# Patient Record
(demographics unavailable — no encounter records)

---

## 2024-11-26 NOTE — HISTORY OF PRESENT ILLNESS
[FreeTextEntry1] : CPE [de-identified] : 60 y/o M with PMH of obesity, HTN, HLD presents for CPE. He ran out of HTN medications 1.5 months ago. No other acute medical complaints

## 2024-11-26 NOTE — PLAN
[FreeTextEntry1] : #alcohol abuse #hx elevated LFTs advised patient on the importance of quitting alcohol patient declined program or therapy educated patient how alcohol abuse can affect his HTN, diabetes, obesity, and overall mortality sent for abdominal ultrasound   #HTN BP in office today 160/100 Encouraged regular exercise, weight loss, adhering to a DASH diet, minimizing alcohol intake, getting adequate sleep, and monitoring BP at home regularly Advised to take blood pressure at home if patient experiences dizziness, lightheadedness, or get a sudden headache  sent lisinopril 20mg QD refill  #HLD Advised patient on regular exercise, weight loss, and maintaining a low fat diet refilled simvastatin   #DM Patient reports home FS averages of Last A1C 7.6 Sent for A1C in office today Encouraged regularly monitoring home blood sugars, regular exercise, weight loss, adhering to a low carb/low fat diet Advised to monitor for symptoms of high blood sugars including excessive thirst, frequent urination, dry mouth, poor concentration and fatigue refilled glipizide and metformin  #Health Maintenance underwent fecal occult previously- was normal offered pt colonoscopy, he declined- sent for fecal occult Routine EKG performed today Routine labs sent including CBC, CMP, A1C, Lipid Profile, TSH w/ reflex, HIV, Hep C, UA, PSA follow up in 5 weeks

## 2024-11-26 NOTE — HEALTH RISK ASSESSMENT
[Excellent] : ~his/her~  mood as  excellent [Yes] : Yes [4 or more  times a week (4 pts)] : 4 or more  times a week (4 points) [3 or 4 (1 pt)] : 3 or 4  (1 point) [Less than monthly (1 pt)] : Less than monthly (1 point) [No falls in past year] : Patient reported no falls in the past year [Former] : Former [15-19] : 15-19 [> 15 Years] : > 15 Years [NO] : No [Patient declined colonoscopy] : Patient declined colonoscopy [HIV Test offered] : HIV Test offered [Hepatitis C test offered] : Hepatitis C test offered [None] : None [With Family] : lives with family [] :  [Sexually Active] : sexually active [Feels Safe at Home] : Feels safe at home [Fully functional (bathing, dressing, toileting, transferring, walking, feeding)] : Fully functional (bathing, dressing, toileting, transferring, walking, feeding) [Fully functional (using the telephone, shopping, preparing meals, housekeeping, doing laundry, using] : Fully functional and needs no help or supervision to perform IADLs (using the telephone, shopping, preparing meals, housekeeping, doing laundry, using transportation, managing medications and managing finances) [Smoke Detector] : smoke detector [Carbon Monoxide Detector] : carbon monoxide detector [Safety elements used in home] : safety elements used in home [Seat Belt] :  uses seat belt [Audit-CScore] : 6 [Change in mental status noted] : No change in mental status noted [Reports changes in hearing] : Reports no changes in hearing [Reports changes in vision] : Reports no changes in vision [Reports changes in dental health] : Reports no changes in dental health [Travel to Developing Areas] : does not  travel to developing areas [TB Exposure] : is not being exposed to tuberculosis [Caregiver Concerns] : does not have caregiver concerns [ColonoscopyComments] : previous negative fecal occult

## 2024-11-26 NOTE — COUNSELING
[Engage in a relaxing activity] : Engage in a relaxing activity [No] : Risk of tobacco use and health benefits of smoking cessation discussed: No [AUDIT-C Screening administered and reviewed] : AUDIT-C Screening administered and reviewed [Hazards of at-risk alcohol use discussed] : Hazards of at-risk alcohol use discussed [Strategies to reduce or eliminate alcohol use discussed] : Strategies to reduce or eliminate alcohol use discussed [Needs reinforcement, provided] : Patient needs reinforcement on understanding of disease, goals and obesity follow-up plan; reinforcement was provided [None] : None [Good understanding] : Patient has a good understanding of lifestyle changes and steps needed to achieve self management goal

## 2025-02-03 NOTE — ASSESSMENT
[FreeTextEntry1] :  #HTN BP in office today 120/80 since restarting BP medication Previously 160/100 when non compliant with medication continue lisinopril 20mg daily Encouraged regular exercise, weight loss, adhering to a DASH diet, minimizing alcohol intake, getting adequate sleep, and monitoring BP at home regularly Advised to take blood pressure at home if patient experiences dizziness, lightheadedness, or get a sudden headache    #DM Last A1C 7.6 A1C in office today 9.2 Patient notes he had a few day episode of increased thirst with increased urination, no current symptoms in office today. I noted to him that this may be indicative of hyperglycemia. Pt notes he was only taking metformin 1000mg in the morning Reenforced to take metformin 1000mg BID Daughter at bedside also endorses that diet needs improvement Encouraged regularly monitoring home blood sugars, regular exercise, weight loss, adhering to a low carb/low fat diet, advised to increase vegetable intake and reduce carbohydrates Advised to monitor for symptoms of high blood sugars including excessive thirst, frequent urination, dry mouth, poor concentration and fatigue  #HCM f/u in 10 weeks

## 2025-02-03 NOTE — HISTORY OF PRESENT ILLNESS
[FreeTextEntry1] : f/u visit [de-identified] : 61 y/o M with PMH of HTN, HLD, DM2 and obesity presents for follow up. Last time he was here his BP was elevated at 160/100 due to medication noncompliance. Since then he states he is taking his HTN medications regularly and /80 today. He notes that two weeks ago he had several days of excessive thirst with increased urination, symptoms have since resolved. Upon further discussion it was discovered that he was only taking metformin 1000mg in the morning.

## 2025-02-03 NOTE — COUNSELING
[Potential consequences of obesity discussed] : Potential consequences of obesity discussed [Benefits of weight loss discussed] : Benefits of weight loss discussed [Encouraged to maintain food diary] : Encouraged to maintain food diary [Encouraged to increase physical activity] : Encouraged to increase physical activity [Patient motivation] : Patient motivation [Needs reinforcement, provided] : Patient needs reinforcement on understanding of lifestyle changes and steps needed to achieve self management goal; reinforcement was provided

## 2025-02-03 NOTE — PHYSICAL EXAM
[No Acute Distress] : no acute distress [Well Nourished] : well nourished [Well Developed] : well developed [Well-Appearing] : well-appearing [Normal Sclera/Conjunctiva] : normal sclera/conjunctiva [PERRL] : pupils equal round and reactive to light [EOMI] : extraocular movements intact [Normal Outer Ear/Nose] : the outer ears and nose were normal in appearance [Normal Oropharynx] : the oropharynx was normal [No Respiratory Distress] : no respiratory distress  [No Accessory Muscle Use] : no accessory muscle use [Clear to Auscultation] : lungs were clear to auscultation bilaterally [Normal Rate] : normal rate  [Regular Rhythm] : with a regular rhythm [Normal S1, S2] : normal S1 and S2 [No Murmur] : no murmur heard [No Carotid Bruits] : no carotid bruits [No Abdominal Bruit] : a ~M bruit was not heard ~T in the abdomen [No Varicosities] : no varicosities [Pedal Pulses Present] : the pedal pulses are present [No Edema] : there was no peripheral edema [No Palpable Aorta] : no palpable aorta [No Extremity Clubbing/Cyanosis] : no extremity clubbing/cyanosis [Soft] : abdomen soft [Non Tender] : non-tender [Non-distended] : non-distended [No Masses] : no abdominal mass palpated [No HSM] : no HSM [Normal Bowel Sounds] : normal bowel sounds [No Joint Swelling] : no joint swelling [Grossly Normal Strength/Tone] : grossly normal strength/tone [No Rash] : no rash [Coordination Grossly Intact] : coordination grossly intact [No Focal Deficits] : no focal deficits [Normal Gait] : normal gait [Deep Tendon Reflexes (DTR)] : deep tendon reflexes were 2+ and symmetric [Normal Affect] : the affect was normal [Normal Insight/Judgement] : insight and judgment were intact

## 2025-05-15 NOTE — COUNSELING
[Weight management counseling provided] : Weight management [Healthy eating counseling provided] : healthy eating [Activity counseling provided] : activity [Good understanding] : Patient has a good understanding of disease, goals and obesity follow-up plan [Target Wt Loss Goal ___] : Target weight loss goal [unfilled] lbs [Weigh Self Once Weekly] : Weigh self once weekly [Decrease Portions] : Decrease food portions [___ min/wk activity recommended] : [unfilled] min/wk activity recommended

## 2025-05-15 NOTE — ASSESSMENT
[Patient Optimized for Surgery] : Patient optimized for surgery [ECG] : ECG [Modify medications prior to procedure] : Modify medications prior to procedure [As per surgery] : as per surgery [FreeTextEntry4] : Patient medically optimized for procedure pending lab results.  [FreeTextEntry7] : Stop Glipizide 24 hrs before procedure
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
## 2025-05-15 NOTE — RESULTS/DATA
[] : not indicated [de-identified] : Ordered  [de-identified] : Ordered  [de-identified] : Ordered  [de-identified] : NS

## 2025-05-15 NOTE — PHYSICAL EXAM
[No Acute Distress] : no acute distress [Well Nourished] : well nourished [Well Developed] : well developed [Well-Appearing] : well-appearing [Normal Sclera/Conjunctiva] : normal sclera/conjunctiva [PERRL] : pupils equal round and reactive to light [EOMI] : extraocular movements intact [Normal Outer Ear/Nose] : the outer ears and nose were normal in appearance [Normal Oropharynx] : the oropharynx was normal [No JVD] : no jugular venous distention [No Lymphadenopathy] : no lymphadenopathy [Supple] : supple [Thyroid Normal, No Nodules] : the thyroid was normal and there were no nodules present [No Respiratory Distress] : no respiratory distress  [No Accessory Muscle Use] : no accessory muscle use [Clear to Auscultation] : lungs were clear to auscultation bilaterally [Normal Rate] : normal rate  [Regular Rhythm] : with a regular rhythm [Normal S1, S2] : normal S1 and S2 [No Murmur] : no murmur heard [No Carotid Bruits] : no carotid bruits [No Abdominal Bruit] : a ~M bruit was not heard ~T in the abdomen [No Varicosities] : no varicosities [Pedal Pulses Present] : the pedal pulses are present [No Edema] : there was no peripheral edema [No Palpable Aorta] : no palpable aorta [No Extremity Clubbing/Cyanosis] : no extremity clubbing/cyanosis [Soft] : abdomen soft [Non Tender] : non-tender [Non-distended] : non-distended [No Masses] : no abdominal mass palpated [No HSM] : no HSM [Normal Bowel Sounds] : normal bowel sounds [Normal Posterior Cervical Nodes] : no posterior cervical lymphadenopathy [Normal Anterior Cervical Nodes] : no anterior cervical lymphadenopathy [No CVA Tenderness] : no CVA  tenderness [No Spinal Tenderness] : no spinal tenderness [No Rash] : no rash [Coordination Grossly Intact] : coordination grossly intact [No Focal Deficits] : no focal deficits [Normal Gait] : normal gait [Deep Tendon Reflexes (DTR)] : deep tendon reflexes were 2+ and symmetric [Normal Affect] : the affect was normal [Normal Insight/Judgement] : insight and judgment were intact [de-identified] : Limited ROM of right shoulder

## 2025-05-15 NOTE — PLAN
[FreeTextEntry1] : 60 M PMHx HTN, HLD, obesity, DM2, MARTI, ETOH abuse   Pre-op - Left shoulder arthroscopy  - CBC/CMP/PT/INR ordered - ECG NS  - Vitals, HTN  - Spoke about medications  - Informed to avoid NSAIDs, use Tylenol and lidocaine patch for pain  - Patient medically optimized for procedure pending lab results   DM  - A1c ordered - Hold Glipizide 24 hrs before procedure   ETOH abuse - counseling provided - Patient wants to wean himself off on his own, denied any services